# Patient Record
Sex: MALE | Race: WHITE | NOT HISPANIC OR LATINO
[De-identification: names, ages, dates, MRNs, and addresses within clinical notes are randomized per-mention and may not be internally consistent; named-entity substitution may affect disease eponyms.]

---

## 2018-09-19 ENCOUNTER — APPOINTMENT (OUTPATIENT)
Dept: UROLOGY | Facility: CLINIC | Age: 23
End: 2018-09-19
Payer: COMMERCIAL

## 2018-09-19 ENCOUNTER — OUTPATIENT (OUTPATIENT)
Dept: OUTPATIENT SERVICES | Facility: HOSPITAL | Age: 23
LOS: 1 days | Discharge: HOME | End: 2018-09-19

## 2018-09-19 VITALS
SYSTOLIC BLOOD PRESSURE: 164 MMHG | WEIGHT: 209 LBS | HEART RATE: 73 BPM | HEIGHT: 73 IN | DIASTOLIC BLOOD PRESSURE: 74 MMHG | BODY MASS INDEX: 27.7 KG/M2

## 2018-09-19 DIAGNOSIS — Z72.51 HIGH RISK HETEROSEXUAL BEHAVIOR: ICD-10-CM

## 2018-09-19 DIAGNOSIS — Z78.9 OTHER SPECIFIED HEALTH STATUS: ICD-10-CM

## 2018-09-19 PROBLEM — Z00.00 ENCOUNTER FOR PREVENTIVE HEALTH EXAMINATION: Status: ACTIVE | Noted: 2018-09-19

## 2018-09-19 PROCEDURE — 99203 OFFICE O/P NEW LOW 30 MIN: CPT

## 2018-09-20 DIAGNOSIS — Z72.51 HIGH RISK HETEROSEXUAL BEHAVIOR: ICD-10-CM

## 2018-09-21 LAB
APPEARANCE: CLEAR
BILIRUBIN URINE: NEGATIVE
BLOOD URINE: NEGATIVE
COLOR: NORMAL
GLUCOSE QUALITATIVE U: NEGATIVE
KETONES URINE: ABNORMAL
LEUKOCYTE ESTERASE URINE: NEGATIVE
NITRITE URINE: NEGATIVE
PH URINE: 7
PROTEIN URINE: NEGATIVE
SPECIFIC GRAVITY URINE: >=1.03
UROBILINOGEN URINE: 0.2 (ref 0.2–?)

## 2018-10-02 LAB
MYCOPLASMA HOMINIS CULTURE: NORMAL
UREAPLASMA CULTURE: NORMAL
UREAPLASMA, PRELIMINARY CULTURE: NORMAL

## 2018-11-06 ENCOUNTER — OUTPATIENT (OUTPATIENT)
Dept: OUTPATIENT SERVICES | Facility: HOSPITAL | Age: 23
LOS: 1 days | Discharge: HOME | End: 2018-11-06

## 2018-11-06 DIAGNOSIS — Z00.00 ENCOUNTER FOR GENERAL ADULT MEDICAL EXAMINATION WITHOUT ABNORMAL FINDINGS: ICD-10-CM

## 2018-11-07 LAB
HCV AB SER QL: NONREACTIVE
HCV S/CO RATIO: 0.11 S/CO
HSV 1+2 IGG SER IA-IMP: NEGATIVE
HSV 1+2 IGG SER IA-IMP: NEGATIVE
HSV1 IGG SER QL: 0.24 INDEX
HSV2 IGG SER QL: 0.12 INDEX
T PALLIDUM AB SER QL IA: NEGATIVE

## 2018-11-09 LAB
HSV1 IGM SER QL: NORMAL TITER
HSV2 AB FLD-ACNC: NORMAL TITER

## 2020-12-09 ENCOUNTER — APPOINTMENT (OUTPATIENT)
Dept: UROLOGY | Facility: CLINIC | Age: 25
End: 2020-12-09
Payer: COMMERCIAL

## 2020-12-09 VITALS — HEIGHT: 73 IN | TEMPERATURE: 98.3 F | BODY MASS INDEX: 27.7 KG/M2 | WEIGHT: 209 LBS

## 2020-12-09 PROCEDURE — 99072 ADDL SUPL MATRL&STAF TM PHE: CPT

## 2020-12-09 PROCEDURE — 99213 OFFICE O/P EST LOW 20 MIN: CPT

## 2020-12-09 NOTE — REVIEW OF SYSTEMS
[Feeling Poorly] : not feeling poorly [Feeling Tired] : not feeling tired [Chest Pain] : no chest pain [Cough] : no cough [Constipation] : no constipation [Dysuria] : no dysuria

## 2020-12-09 NOTE — ASSESSMENT
[FreeTextEntry1] : Minimal vein right dorsal penis question resolving thrombosis. Patient reassured. Can take aspirin daily

## 2020-12-09 NOTE — PHYSICAL EXAM
[General Appearance - In No Acute Distress] : no acute distress [Penis Abnormality] : normal uncircumcised penis [FreeTextEntry1] : Small vein proximal right dorsal surface minimal firmness [Edema] : no peripheral edema [] : no respiratory distress [Oriented To Time, Place, And Person] : oriented to person, place, and time [Normal Station and Gait] : the gait and station were normal for the patient's age

## 2020-12-28 ENCOUNTER — APPOINTMENT (OUTPATIENT)
Dept: UROLOGY | Facility: CLINIC | Age: 25
End: 2020-12-28

## 2023-03-14 ENCOUNTER — APPOINTMENT (OUTPATIENT)
Dept: PSYCHIATRY | Facility: CLINIC | Age: 28
End: 2023-03-14

## 2023-03-14 ENCOUNTER — OUTPATIENT (OUTPATIENT)
Dept: OUTPATIENT SERVICES | Facility: HOSPITAL | Age: 28
LOS: 1 days | End: 2023-03-14
Payer: COMMERCIAL

## 2023-03-14 DIAGNOSIS — F41.9 ANXIETY DISORDER, UNSPECIFIED: ICD-10-CM

## 2023-03-14 DIAGNOSIS — F33.3 MAJOR DEPRESSIVE DISORDER, RECURRENT, SEVERE WITH PSYCHOTIC SYMPTOMS: ICD-10-CM

## 2023-03-14 PROCEDURE — 90791 PSYCH DIAGNOSTIC EVALUATION: CPT

## 2023-03-14 NOTE — RISK ASSESSMENT
[Clinical Interview] : Clinical Interview [Mood disorder] : mood disorder [Depressed mood/Anhedonia] : depressed mood/anhedonia [Severe anxiety, agitation or panic] : severe anxiety, agitation or panic [Family Hx of suicide/suicidal behavior] : family history of suicide/suicidal behavior [Identifies reasons for living] : identifies reasons for living [Supportive social network of family or friends] : supportive social network of family or friends [Responsibility to children, family, or others] : responsibility to children, family, or others [Fear of death/actual act of killing self] : fear of death or the actual act of killing self [Engaged in work or school] : engaged in work or school [Beloved pets] : beloved pets [None in the patient's lifetime] : None in the patient's lifetime [No known risk factors] : No known risk factors [Feeling of being under threat and being unable to control threat] : feeling of being under threat and being unable to control threat [Irritability] : irritability [Residential stability] : residential stability [Relationship stability] : relationship stability [Employment stability] : employment stability [Engagement in treatment] : engagement in treatment [No] : no

## 2023-03-15 DIAGNOSIS — F41.9 ANXIETY DISORDER, UNSPECIFIED: ICD-10-CM

## 2023-03-15 DIAGNOSIS — F33.3 MAJOR DEPRESSIVE DISORDER, RECURRENT, SEVERE WITH PSYCHOTIC SYMPTOMS: ICD-10-CM

## 2023-03-20 NOTE — DISCUSSION/SUMMARY
[Low acute suicide risk] : Low acute suicide risk [No] : No [Not clinically indicated] : Safety Plan completed/updated (for individuals at risk): Not clinically indicated [FreeTextEntry1] : Assessment Summary: Referral received from Dr Stevens. Intake complete on 3/14/23 from 10:AM- 10:30 AM. Patient signed all consents including HIPPA release form for his mother Delmy and his PCP Dr Sidhu and Dr Arroyo office. Patient denied  referral.  \par \par Patient is 28 y/o M, single, no kids. Domiciled with his parents' mom is 59 y/o F, dad is 63 y/o. Has one older brother 35 y/o M. Currently employed at [employer] as a  but is on disability for 2 more weeks. Denied Substance use. Trauma- denies, denies IPP, denies S/H/I self-harming behaviors, currently denies S/H/I. Denies incarceration. Denies smoking cigarettes or marijuana. Socially would have a drink “depends on the mood, beer, wine”. Denies damage to property.  \par Diagnosis of: OCD- “Hypochondria fear of getting sick, obsessive thoughts, anxiety, frequent hand washing, knocks on wood I’m superstitious”. Has anxiety, and a low Vitamin D. Reports he just started Magnesium and Vitamin D. He takes an antidepressant: fluoxetine 100 mg. Being prescribed by his NP at Dr [facility]s private clinic.  Been seeing the NP in Dr Lou's office since November 2022. PCP is Dr Sidhu. Reported that his cousin committed suicide 2 years ago he was 28 y/o. Denies S/H/I or self-harming behavior.  \par \par Approved by Aaron to come to our clinic for therapy only and Aaron requested A[facility], patient states he may eventually move his services here for psychiatry as well.      \par  \par Assessed Needs- Functional Domain: Anxiety/OCD\par  \par Prioritized Assessed Needs: Anxiety/OCD  \par  \par Life goals, strengths, barriers, past successes: "get , have children, to have my own house, and be mentally stable, strengths: very observant, athletic, I'm a good listener,   barriers: OCD symptoms, past successes: college degree, i saved up money. \par  \par Recommendation:\par  \par [ ]      Medication Only\par [x ]     Individual therapy only\par [ ]     Medication and Individual therapy\par [ ]     Group therapy

## 2023-03-20 NOTE — PSYCHOSOCIAL ASSESSMENT
[None known] : None known [Other: _____] : [unfilled] [Competitive and integrated employment] : Competitive and integrated employment [35 hours or more] : 35 hours or more [Earned income] : earned income [Financially stable] : financially stable [None] : none [Private residence (home, apartment, rooming house, hotel, motel, supported housing, supported Single Room Occupancy (SRO),] : Private residence (home, apartment, rooming house, hotel, motel, supported housing, supported Single Room Occupancy (SRO), permanent housing programs, transient housing programs, and shelter plus care housing) [Client's parents (biological, adoptive, stepparent)] : client's parents (biological, adoptive, stepparent) [Mother] : mother [No] : Patient has personal representation (legal guardian, representative payee, conservatorship)? No [FreeTextEntry7] : Delmy Villatoro [phone]

## 2023-03-20 NOTE — SOCIAL HISTORY
[FreeTextEntry1] : Employment history:Currently employed at [employer] as a  but is on disability for 2 more weeks. \par Developmental history: As a baby in  had delayed speech. \par Social supports (friends, Volunteers, club, AA meeting, other meetings ) ?: no\par Meaningful Activities: "i work out occasionally"

## 2023-03-20 NOTE — PAST MEDICAL HISTORY
[FreeTextEntry1] : Referral received from Dr Stevens. Intake complete on 3/14/23 from 10:AM- 10:30 AM. Patient signed all consents including HIPPA release form for his mother Delmy and his PCP Dr Sidhu and Dr Arroyo office. Patient denied  referral.  \par \par Patient is 28 y/o M, single, no kids. Domiciled with his parents' mom is 59 y/o F, dad is 61 y/o. Has one older brother 35 y/o M. Currently employed at [employer] as a  but is on disability for 2 more weeks. Denied Substance use. Trauma- denies, denies IPP, denies S/H/I self-harming behaviors, currently denies S/H/I. Denies incarceration. Denies smoking cigarettes or marijuana. Socially would have a drink “depends on the mood, beer, wine”. Denies damage to property.  \par \par Diagnosis of: OCD- “Hypochondria fear of getting sick, obsessive thoughts, anxiety, frequent hand washing, knocks on wood I’m superstitious”. Has anxiety, and a low Vitamin D. Reports he just started Magnesium and Vitamin D. He takes an antidepressant: fluoxetine 100 mg. Being prescribed by his NP at [facility] private clinic.  Been seeing the NP in Dr Lou's office since November 2022. PCP is Dr Sidhu. Reported that his cousin committed suicide 2 years ago he was 28 y/o. Denies S/H/I or self-harming behavior.  \par \par Approved by Aaron to come to our clinic for therapy only and Aaron requested Ayla, patient states he may eventually move his services here for psychiatry as well.

## 2023-03-20 NOTE — HISTORY OF PRESENT ILLNESS
[Not Applicable] : Not applicable [FreeTextEntry1] : Referral received from Dr Stevens. Intake complete on 3/14/23 from 10:AM- 10:30 AM. Patient signed all consents including HIPPA release form for his mother Delmy and his PCP Dr Sidhu and Dr Lou's office. Patient denied  referral.  \par \par Patient is 28 y/o M, single, no kids. Domiciled with his parents' mom is 57 y/o F, dad is 61 y/o. Has one older brother 33 y/o M. Currently employed at [employer] as a  but is on disability for 2 more weeks. Denied Substance use. Trauma- denies, denies IPP, denies S/H/I self-harming behaviors, currently denies S/H/I. Denies incarceration. Denies smoking cigarettes or marijuana. Socially would have a drink “depends on the mood, beer, wine”. Denies damage to property.  \par \par Diagnosis of: OCD- “Hypochondria fear of getting sick, obsessive thoughts, anxiety, frequent hand washing, knocks on wood I’m superstitious”. Has anxiety, and a low Vitamin D. Reports he just started Magnesium and Vitamin D. He takes an antidepressant: fluoxetine 100 mg. Being prescribed by his NP at [facility].  Been seeing the NP in Dr Arroyo office since November 2022. PCP is Dr Sidhu. Reported that his cousin committed suicide 2 years ago he was 28 y/o. Denies S/H/I or self-harming behavior.  \par \par Approved by Aaron to come to our clinic for therapy only and Aaron requested Ayla, patient states he may eventually move his services here for psychiatry as well.

## 2023-03-20 NOTE — FAMILY HISTORY
[FreeTextEntry1] : Family composition: Patient is 26 y/o M, single, no kids. Domiciled with his parents' mom is 57 y/o F, dad is 61 y/o. Has one older brother 33 y/o M. \par Family history and background:Born in [place] and raised in [place]. His parents are of [place] descent. \par Family relationship: Good.\par Pertinent Family Medical, MH and Substance Use History including Adult Child of Alcoholic and child of substance abuse status; history of cancer and heart disease:\par Family is in good shape. \par \par Cultural Assessment:\par Race/ethnicity: White\par Sexual identity: Straight\par Spirituality/Restorationism: Church\par \par

## 2023-03-20 NOTE — HEALTH RISK ASSESSMENT
[Patient/Caregiver not ready to engage] : , patient/caregiver not ready to engage [Patient/Collateral not ready to engage] : , patient/collateral not ready to engage [No, patient unwilling or unclear about wishes] : No, patient unwilling or unclear about wishes [AdvancecareDate] : 3/14/23 [] : 3/14/23

## 2023-03-20 NOTE — REASON FOR VISIT
[Number can be texted] : number can be texted [OK  to leave message] : OK  to leave message [Other:___] : [unfilled] [[facility] Provider/Facility] : [facility] Provider/Facility [Patient] : Patient [FreeTextEntry3] : [email or web address] [FreeTextEntry5] : English [FreeTextEntry6] : Kvng [FreeTextEntry2] : OCD, Anxiety. [FreeTextEntry1] : OCD thoughts, Hypochondria, fear of getting sick.

## 2023-03-21 ENCOUNTER — TRANSCRIPTION ENCOUNTER (OUTPATIENT)
Age: 28
End: 2023-03-21

## 2023-03-21 ENCOUNTER — APPOINTMENT (OUTPATIENT)
Dept: PSYCHIATRY | Facility: CLINIC | Age: 28
End: 2023-03-21

## 2023-03-21 ENCOUNTER — OUTPATIENT (OUTPATIENT)
Dept: OUTPATIENT SERVICES | Facility: HOSPITAL | Age: 28
LOS: 1 days | End: 2023-03-21
Payer: COMMERCIAL

## 2023-03-21 DIAGNOSIS — F33.1 MAJOR DEPRESSIVE DISORDER, RECURRENT, MODERATE: ICD-10-CM

## 2023-03-21 DIAGNOSIS — F42.2 MIXED OBSESSIONAL THOUGHTS AND ACTS: ICD-10-CM

## 2023-03-21 PROCEDURE — 90834 PSYTX W PT 45 MINUTES: CPT

## 2023-03-21 NOTE — PLAN
[FreeTextEntry2] : "My goal is to not live in fear" [Other: ____] : [unfilled] [de-identified] : Kvng was seen in person for first individual therapy session after intake assessment completed. Contact information provided. \par Presented with sunglasses and asked to keep them on because of "eye floaters that bother me with the light". Patient cooperative, talkative and candid about his symptoms. \par Patient is 26 y/o single male, no kids and domiciled with his parents.. Has one older brother 33 y/o M. Currently employed at [employer] as a , on disability and will be returning to the office next week. Reports worried about his return as he has not been in the office since November, 2022.  Has a coworker (sits next to him) which he describes as a bully, condescending which he feels he has to "walk on eggshells around him". Denied Substance use. Trauma- denies, denies IPP, denies S/H/I self-harming behaviors, currently denies S/H/I. Denies incarceration. Denies smoking cigarettes or marijuana. \par He reports his anxiety stemming from losing his hair in HS (had hair transplant), feeling social anxiety, panic, described self as shy.  Also remembers receiving Speech services before  because he was a late talker. \par He blames his symptoms worsened after having Covid which affected his GI track. As a result, he started thinking he had cancer and other medical issues.  \par He started "seeing eye floaters"  and they are worsen by brightness, feels his anxiety makes him think that eventually he lose his sight.  Patient has isolated for the last 4 months at home and avoids going out if he can.  He has been "bonding with Dad" as he is retired.  Admits that he avoids hanging out with his friends even when some reach out to him.  He misses being in a romantic relationship, last one ended in 2020 after 2 years of relationship. \par \par As per intake, patient carries a Diagnosis of: OCD- “Hypochondria fear of getting sick, obsessive thoughts, anxiety, frequent hand washing, knocks on wood I’m superstitious”. Has anxiety, and a low Vitamin D. Reports he just started Magnesium and Vitamin D. He takes an antidepressant: fluoxetine 100 mg prescribed by his NP at [facility] private clinic. Been seeing the NP in Dr Arroyo office since November 2022. PCP is Dr Sidhu. Reported that his cousin committed suicide 2 years ago he was 26 y/o. Denies S/H/I or self-harming behavior. \par \par Patient sought treatment during college years at [place] [facility] but only went to 3 sessions as "I felt they did not take me seriously. \par Kvng verbalized commitment to therapy going forward.  May need to do virtual during lunch next week. \par  [Return in ____ week(s)] : Return in [unfilled] week(s) [FreeTextEntry1] : Admit in next session.

## 2023-03-21 NOTE — REASON FOR VISIT
[FreeTextEntry4] : 102pm [FreeTextEntry2] : 3/21/2023 [FreeTextEntry5] : 150pm [Patient] : Patient [FreeTextEntry1] : 1st visit with writer after intake assessment

## 2023-03-21 NOTE — PHYSICAL EXAM
[Cooperative] : cooperative [Depressed] : depressed [Anxious] : anxious [Full] : full [Clear] : clear [Linear/Goal Directed] : linear/goal directed [Preoccupations/Ruminations] : preoccupations/ruminations [Obsessional] : obsessional [None Reported] : none reported [Average] : average [WNL] : within normal limits [de-identified] : Had sunglasses on, reported having floaters and bright light bothers him. [FreeTextEntry8] : reports depression, increase anxiety as he has to [place] the city

## 2023-03-21 NOTE — RISK ASSESSMENT
[Yes, patient reports ideation or behavior] : Yes, patient reports ideation or behavior [No, patient denies ideation or behavior] : No, patient denies ideation or behavior [FreeTextEntry8] : Admitted to fleeting SI, denies intention to harm self. No history

## 2023-03-22 DIAGNOSIS — F42.2 MIXED OBSESSIONAL THOUGHTS AND ACTS: ICD-10-CM

## 2023-03-29 ENCOUNTER — APPOINTMENT (OUTPATIENT)
Dept: PSYCHIATRY | Facility: CLINIC | Age: 28
End: 2023-03-29

## 2023-03-29 ENCOUNTER — NON-APPOINTMENT (OUTPATIENT)
Age: 28
End: 2023-03-29

## 2023-03-29 ENCOUNTER — OUTPATIENT (OUTPATIENT)
Dept: OUTPATIENT SERVICES | Facility: HOSPITAL | Age: 28
LOS: 1 days | End: 2023-03-29
Payer: COMMERCIAL

## 2023-03-29 DIAGNOSIS — F33.1 MAJOR DEPRESSIVE DISORDER, RECURRENT, MODERATE: ICD-10-CM

## 2023-03-29 DIAGNOSIS — F42.2 MIXED OBSESSIONAL THOUGHTS AND ACTS: ICD-10-CM

## 2023-03-29 PROCEDURE — 90832 PSYTX W PT 30 MINUTES: CPT | Mod: 95

## 2023-03-29 NOTE — PHYSICAL EXAM
[Cooperative] : cooperative [Anxious] : anxious [Full] : full [Clear] : clear [Linear/Goal Directed] : linear/goal directed [Preoccupations/Ruminations] : preoccupations/ruminations [None Reported] : none reported [Average] : average [WNL] : within normal limits

## 2023-03-29 NOTE — REASON FOR VISIT
[Patient preference] : as per patient preference [Starting, patient seen in-person within last 6 months] : Telehealth services are being started as patient has seen in-person within last 6 months. [Telehealth (audio & video) - Individual/Group] : This visit was provided via telehealth using real-time 2-way audio visual technology. [Other Location: e.g. Home (Enter Location, City,State)___] : The provider was located at [unfilled]. [Home] : The patient, [unfilled], was located at home, [unfilled], at the time of the visit. [Verbal consent obtained from patient/other participant(s)] : Verbal consent for telehealth/telephonic services obtained from patient/other participant(s) [Patient] : Patient [FreeTextEntry4] : 100pm [FreeTextEntry5] : 130pm [FreeTextEntry1] : Admitted to clinic - Psychotherapy follow up

## 2023-03-29 NOTE — PLAN
[Psychoeducation] : Psychoeducation  [Skills training (all types)] : Skills training (all types)  [Supportive Therapy] : Supportive Therapy [FreeTextEntry2] : Reduce anxiety, obsessions and compulsions tendencies [de-identified] : Patient was seen via telehealth video for follow up. \par Reported going back to work on Monday and having a good day.  However yesterday he had to call his mother by midday as he was struggling with anxiety episode.  \par Shared that "the coworkers that were bullying me" were let go which he is relieved of and his former boss is being reinstated as of tomorrow.  He was able to work from home one day a week and chose today as he had therapy session. \par Patient requested tools for his anxiety. Psychoeducation provided for relaxation techniques for box breathing and alternate breathing. \par Clinician demonstrated techniques and encouraged daily use. Also, encouraged to take lunch breaks and complete a form of physical activity. \par Patient was receptive to intervention.  Patient prefers follow up in person and with standing time/day.\par Support provided. \par  [Return in ____ week(s)] : Return in [unfilled] week(s)

## 2023-03-30 DIAGNOSIS — F42.2 MIXED OBSESSIONAL THOUGHTS AND ACTS: ICD-10-CM

## 2023-04-04 ENCOUNTER — APPOINTMENT (OUTPATIENT)
Dept: PSYCHIATRY | Facility: CLINIC | Age: 28
End: 2023-04-04

## 2023-04-04 ENCOUNTER — OUTPATIENT (OUTPATIENT)
Dept: OUTPATIENT SERVICES | Facility: HOSPITAL | Age: 28
LOS: 1 days | End: 2023-04-04
Payer: COMMERCIAL

## 2023-04-04 DIAGNOSIS — F33.1 MAJOR DEPRESSIVE DISORDER, RECURRENT, MODERATE: ICD-10-CM

## 2023-04-04 PROCEDURE — 90832 PSYTX W PT 30 MINUTES: CPT

## 2023-04-04 NOTE — END OF VISIT
[Duration of Psychotherapy Visit (minutes spent in synchronous communication): ____] : Duration of Psychotherapy Visit (minutes spent in synchronous communication): [unfilled]

## 2023-04-05 DIAGNOSIS — F33.1 MAJOR DEPRESSIVE DISORDER, RECURRENT, MODERATE: ICD-10-CM

## 2023-04-05 NOTE — REASON FOR VISIT
[Patient] : Patient [FreeTextEntry4] : 1138am [FreeTextEntry5] : 1205pm [FreeTextEntry2] : 4/4/2023 [FreeTextEntry1] : Psychotherapy follow up

## 2023-04-05 NOTE — PLAN
[Other: ____] : [unfilled] [Return in ____ week(s)] : Return in [unfilled] week(s) [Motivational Interviewing] : Motivational Interviewing  [Supportive Therapy] : Supportive Therapy [FreeTextEntry2] : Reduce anxiety, obsessions and compulsions tendencies [de-identified] : Patient was seen for follow up in person. \par PHQ and NAKUL completed with patient who scored - mild depression and moderate anxiety. \par Session focused on treatment goals and what he wants to accomplish. He would like to not live by the fear and the anxiety he lives by now.  He enjoys traveling and would like to be able to do it more often, wants to date (voices lack of confidence and not handling rejection well). \par He denies feeling depressed however endorses lack of motivation (which he constantly said "Im just lazy")\par Worried about copays being $50 a session and wants to meet every 2 weeks after next session. \par

## 2023-04-05 NOTE — PHYSICAL EXAM
[Cooperative] : cooperative [Anxious] : anxious [Full] : full [Clear] : clear [Linear/Goal Directed] : linear/goal directed [Preoccupations/Ruminations] : preoccupations/ruminations [None Reported] : none reported [Average] : average [WNL] : within normal limits [de-identified] : Patient reported having a better day yesterday at work and not feeling anxious as last week. \par

## 2023-04-13 ENCOUNTER — APPOINTMENT (OUTPATIENT)
Dept: PSYCHIATRY | Facility: CLINIC | Age: 28
End: 2023-04-13

## 2023-04-13 ENCOUNTER — OUTPATIENT (OUTPATIENT)
Dept: OUTPATIENT SERVICES | Facility: HOSPITAL | Age: 28
LOS: 1 days | End: 2023-04-13
Payer: COMMERCIAL

## 2023-04-13 DIAGNOSIS — F42.2 MIXED OBSESSIONAL THOUGHTS AND ACTS: ICD-10-CM

## 2023-04-13 PROCEDURE — 90834 PSYTX W PT 45 MINUTES: CPT

## 2023-04-13 NOTE — DISCUSSION/SUMMARY
[Initial Plan] : Initial Plan [Adherent to treatment recommendations] : adherent to treatment recommendations [Articulate] : articulate [Attempting to realize their potential] : Attempting to realize their potential [Cognitively intact] : cognitively intact [Insightful] : insightful [Intelligent] : intelligent [Motivated to participate in treatment] : motivated to participate in treatment [Motivated and ready for change] : motivated and ready for change [In good health] : in good health [Financially stable] : financially stable [Part of a supportive family] : part of a supportive family [Steady employment] : steady employment [Involved in cultural/spiritual/Latter-day/community activities] : involved in cultural/spiritual/Latter-day/community activities [Housing stability] : housing stability [English fluency] : English fluency [Connected to healthcare] : connected to healthcare [Access to safe outdoor spaces] : access to safe outdoor spaces [FreeTextEntry2] : 4/13/2024 [FreeTextEntry3] : 3/29/2023 [FreeTextEntry5] : "I want to be able to live without fear, increase confidence in myself  and develop meaningful relationships" [Mental Health] : Mental Health [Interpersonal Relationships] : Interpersonal Relationships [Initial] : Initial [every ___ weeks] : every [unfilled] weeks [Improvement in symptoms as evidenced by:] : Improvement in symptoms as evidenced by: [None - Reason others did not participate:] : None - Reason others did not participate:  [Yes] : Yes [Therapist] : Therapist [FreeTextEntry1] : Anxiety / Obsessions. [FreeTextEntry4] : Kvng will improve his confidence in himself leading to decrease in anxiety/obsessions and increase in meaningful interpersonal relationships in the next 4-6 months as evidence by decrease in depression/anxiety scales and development of at least one new relationship.  [de-identified] : Patient will learn CBT concepts and impact on daily functioning. [de-identified] : Manolo Cardoza will utilize weekly journaling, positive self talk and daily relaxation techniques to recognize emotions and triggers. [de-identified] : 4/13/2024 [de-identified] : Obj. OLLIE Calderon will decrease anxiety by practicing exposure, assertiveness and relaxation techniques by meeting 1-2 people in the next 3 months. [de-identified] : 4/13/2024 [de-identified] : Medication management in the community.  [de-identified] : Increase in social support, decrease in anxiety/depression scales mild range.  [Tobacco Screening Completed?] : Tobacco Screening Completed: Yes [Does patient require any additional health services or referrals?] : Does patient require any additional health services or referrals: No

## 2023-04-13 NOTE — DISCUSSION/SUMMARY
[Initial Plan] : Initial Plan [Adherent to treatment recommendations] : adherent to treatment recommendations [Articulate] : articulate [Attempting to realize their potential] : Attempting to realize their potential [Cognitively intact] : cognitively intact [Insightful] : insightful [Intelligent] : intelligent [Motivated to participate in treatment] : motivated to participate in treatment [Motivated and ready for change] : motivated and ready for change [In good health] : in good health [Financially stable] : financially stable [Part of a supportive family] : part of a supportive family [Steady employment] : steady employment [Involved in cultural/spiritual/Baptism/community activities] : involved in cultural/spiritual/Baptism/community activities [Housing stability] : housing stability [English fluency] : English fluency [Connected to healthcare] : connected to healthcare [Access to safe outdoor spaces] : access to safe outdoor spaces [FreeTextEntry2] : 4/13/2024 [FreeTextEntry3] : 3/29/2023 [FreeTextEntry5] : "I want to be able to live without fear, increase confidence in myself  and develop meaningful relationships" [Mental Health] : Mental Health [Interpersonal Relationships] : Interpersonal Relationships [Initial] : Initial [every ___ weeks] : every [unfilled] weeks [Improvement in symptoms as evidenced by:] : Improvement in symptoms as evidenced by: [None - Reason others did not participate:] : None - Reason others did not participate:  [Yes] : Yes [Therapist] : Therapist [FreeTextEntry1] : Anxiety / Obsessions. [FreeTextEntry4] : Kvng will improve his confidence in himself leading to decrease in anxiety/obsessions and increase in meaningful interpersonal relationships in the next 4-6 months as evidence by decrease in depression/anxiety scales and development of at least one new relationship.  [de-identified] : Patient will learn CBT concepts and impact on daily functioning. [de-identified] : Manolo Cardoza will utilize weekly journaling, positive self talk and daily relaxation techniques to recognize emotions and triggers. [de-identified] : 4/13/2024 [de-identified] : Obj. OLLIE Calderon will decrease anxiety by practicing exposure, assertiveness and relaxation techniques by meeting 1-2 people in the next 3 months. [de-identified] : 4/13/2024 [de-identified] : Medication management in the community.  [de-identified] : Increase in social support, decrease in anxiety/depression scales mild range.  [Tobacco Screening Completed?] : Tobacco Screening Completed: Yes [Does patient require any additional health services or referrals?] : Does patient require any additional health services or referrals: No

## 2023-04-14 DIAGNOSIS — F42.2 MIXED OBSESSIONAL THOUGHTS AND ACTS: ICD-10-CM

## 2023-04-17 NOTE — PHYSICAL EXAM
[Cooperative] : cooperative [Anxious] : anxious [Full] : full [Clear] : clear [Linear/Goal Directed] : linear/goal directed [Preoccupations/Ruminations] : preoccupations/ruminations [None Reported] : none reported [Average] : average [WNL] : within normal limits [FreeTextEntry1] : Continues to wear glasses in therapy sessions [de-identified] : Reports feeling "neutral" mood, not sad not happy. Voiced desire to get off meds. \par

## 2023-04-17 NOTE — PHYSICAL EXAM
[Cooperative] : cooperative [Anxious] : anxious [Full] : full [Clear] : clear [Linear/Goal Directed] : linear/goal directed [Preoccupations/Ruminations] : preoccupations/ruminations [None Reported] : none reported [Average] : average [WNL] : within normal limits [FreeTextEntry1] : Continues to wear glasses in therapy sessions [de-identified] : Reports feeling "neutral" mood, not sad not happy. Voiced desire to get off meds. \par

## 2023-04-17 NOTE — PLAN
[Motivational Interviewing] : Motivational Interviewing  [Supportive Therapy] : Supportive Therapy [Other: ____] : [unfilled] [Return in ____ week(s)] : Return in [unfilled] week(s) [FreeTextEntry2] : Goal:   Kvng will improve his confidence in himself leading to decrease in anxiety/obsessions and increase in meaningful interpersonal relationships in the next 4-6 months as evidence by decrease in depression/anxiety scales and development of at least one new relationship. \par \par Objective A: Obj A- Kvng will utilize weekly journaling, positive self talk and daily relaxation techniques to recognize emotions and triggers. \par Objective B: Obj. B- Kvng will decrease anxiety by practicing exposure, assertiveness and relaxation techniques by meeting 1-2 people in the next 3 months.  [de-identified] : Patient was seen for follow up in person. \par Patient collaborated with development of treatment plan. \par Session focused on goals he wants to accomplish while in therapy.  He shared that she went out to dinner with his parents and brother, which he has not done "in a while".   Discussed how he does not have close relationships "but his parents or brothers also do not have close friendships" and he misses having someone to talk to /hang out". \par He also shared that he was not able to approach someone he found attractive while at dinner. He does not feel ready. \par Patient bothered by the fact that prescriber has not reached out to find out how he is doing as he was acting as his therapist and also received a text from former boss/friend which made him feel that their relationships was not genuine as he was mad because patient was out of work sick "but he was the one who got fired"\par Support provided

## 2023-04-17 NOTE — REASON FOR VISIT
[FreeTextEntry4] : 230pm [FreeTextEntry5] : 3:15pm [FreeTextEntry2] : 4/13/2023 [Patient] : Patient [FreeTextEntry1] : Psychotherapy followup

## 2023-04-17 NOTE — PLAN
[Motivational Interviewing] : Motivational Interviewing  [Supportive Therapy] : Supportive Therapy [Other: ____] : [unfilled] [Return in ____ week(s)] : Return in [unfilled] week(s) [FreeTextEntry2] : Goal:   Kvng will improve his confidence in himself leading to decrease in anxiety/obsessions and increase in meaningful interpersonal relationships in the next 4-6 months as evidence by decrease in depression/anxiety scales and development of at least one new relationship. \par \par Objective A: Obj A- Kvng will utilize weekly journaling, positive self talk and daily relaxation techniques to recognize emotions and triggers. \par Objective B: Obj. B- Kvng will decrease anxiety by practicing exposure, assertiveness and relaxation techniques by meeting 1-2 people in the next 3 months.  [de-identified] : Patient was seen for follow up in person. \par Patient collaborated with development of treatment plan. \par Session focused on goals he wants to accomplish while in therapy.  He shared that she went out to dinner with his parents and brother, which he has not done "in a while".   Discussed how he does not have close relationships "but his parents or brothers also do not have close friendships" and he misses having someone to talk to /hang out". \par He also shared that he was not able to approach someone he found attractive while at dinner. He does not feel ready. \par Patient bothered by the fact that prescriber has not reached out to find out how he is doing as he was acting as his therapist and also received a text from former boss/friend which made him feel that their relationships was not genuine as he was mad because patient was out of work sick "but he was the one who got fired"\par Support provided

## 2023-04-27 ENCOUNTER — OUTPATIENT (OUTPATIENT)
Dept: OUTPATIENT SERVICES | Facility: HOSPITAL | Age: 28
LOS: 1 days | End: 2023-04-27

## 2023-04-27 ENCOUNTER — OUTPATIENT (OUTPATIENT)
Dept: OUTPATIENT SERVICES | Facility: HOSPITAL | Age: 28
LOS: 1 days | End: 2023-04-27
Payer: COMMERCIAL

## 2023-04-27 ENCOUNTER — APPOINTMENT (OUTPATIENT)
Dept: PSYCHIATRY | Facility: CLINIC | Age: 28
End: 2023-04-27

## 2023-04-27 DIAGNOSIS — F42.2 MIXED OBSESSIONAL THOUGHTS AND ACTS: ICD-10-CM

## 2023-04-27 PROCEDURE — 90834 PSYTX W PT 45 MINUTES: CPT

## 2023-04-28 DIAGNOSIS — F42.2 MIXED OBSESSIONAL THOUGHTS AND ACTS: ICD-10-CM

## 2023-05-05 NOTE — REASON FOR VISIT
[Patient] : Patient [FreeTextEntry4] : 235pm [FreeTextEntry5] : 320pm [FreeTextEntry2] : 4/27/2023 [FreeTextEntry1] : Psychotherapy follow up

## 2023-05-05 NOTE — PHYSICAL EXAM
[Well groomed] : well groomed [Cooperative] : cooperative [Anxious] : anxious [Full] : full [Clear] : clear [Linear/Goal Directed] : linear/goal directed [Preoccupations/Ruminations] : preoccupations/ruminations [Obsessional] : obsessional [None Reported] : none reported [Average] : average [WNL] : within normal limits [FreeTextEntry1] : Lights dimmed, patient removed sunglasses in office. [de-identified] : Continues to share desire to stop medications and remain therapy only.\par

## 2023-05-05 NOTE — PLAN
[Cognitive and/or Behavior Therapy] : Cognitive and/or Behavior Therapy  [Jay Therapy] : Jay Therapy  [Exposure +/- Response Prevention] : Exposure +/- Response Prevention  [Supportive Therapy] : Supportive Therapy [FreeTextEntry2] : Goal:   Kvng will improve his confidence in himself leading to decrease in anxiety/obsessions and increase in meaningful interpersonal relationships in the next 4-6 months as evidence by decrease in depression/anxiety scales and development of at least one new relationship. \par \par Objective A: Obj A- Kvng will utilize weekly journaling, positive self talk and daily relaxation techniques to recognize emotions and triggers. \par Objective B: Obj. B- Kvng will decrease anxiety by practicing exposure, assertiveness and relaxation techniques by meeting 1-2 people in the next 3 months.  [de-identified] : Patient was seen for follow up in person. \par Reports still feeling anxiety and apathy and not finding luh, even went on a bike ride and came back after riding half way. \par Session focused on practicing mindfulness, being present in the moment and importance of increased tolerance. Discussed behavioral activation as he feels "I am behind, don’t have a girlfriend, don’t have friends". Discussed with patient joining "meet up" and explored areas for socialization and development of hobbies. Patient was interested in hiking activities and committed to exploring the website and at least join one activity in the next 3 months. \par Patient focused on negative thoughts which makes him depressed.  He was able to complete journal log.  Discussed in session. Finds it difficult to identify his thoughts associated with emotions. \par Support provided. \par  [Return in ____ week(s)] : Return in [unfilled] week(s)

## 2023-05-18 ENCOUNTER — APPOINTMENT (OUTPATIENT)
Dept: PSYCHIATRY | Facility: CLINIC | Age: 28
End: 2023-05-18

## 2023-05-18 ENCOUNTER — OUTPATIENT (OUTPATIENT)
Dept: OUTPATIENT SERVICES | Facility: HOSPITAL | Age: 28
LOS: 1 days | End: 2023-05-18
Payer: COMMERCIAL

## 2023-05-18 DIAGNOSIS — F42.2 MIXED OBSESSIONAL THOUGHTS AND ACTS: ICD-10-CM

## 2023-05-18 PROCEDURE — 90834 PSYTX W PT 45 MINUTES: CPT | Mod: 95

## 2023-05-18 NOTE — END OF VISIT
[Individual Psychotherapy for 38-52 minutes] : Individual Psychotherapy for 38 - 52 minutes [Teletherapy Service Provided] : The services provided in this session were delivered via tele-therapy

## 2023-05-24 NOTE — PHYSICAL EXAM
[Well groomed] : well groomed [Cooperative] : cooperative [Anxious] : anxious [Full] : full [Clear] : clear [Linear/Goal Directed] : linear/goal directed [Preoccupations/Ruminations] : preoccupations/ruminations [Obsessional] : obsessional [None Reported] : none reported [Average] : average [WNL] : within normal limits [FreeTextEntry5] : not as engaged, increased hopelessness about his symptoms [de-identified] : Stopped medications, observed to be restless, moving leg, changing positions. \par

## 2023-05-24 NOTE — REASON FOR VISIT
[Patient preference] : as per patient preference [Starting, patient seen in-person within last 6 months] : Telehealth services are being started as patient has seen in-person within last 6 months. [Telehealth (audio & video) - Individual/Group] : This visit was provided via telehealth using real-time 2-way audio visual technology. [Home] : The patient, [unfilled], was located at home, [unfilled], at the time of the visit. [Verbal consent obtained from patient/other participant(s)] : Verbal consent for telehealth/telephonic services obtained from patient/other participant(s) [Patient] : Patient [Medical Office: ([facility])___] : The provider was located at the medical office in [unfilled]. [FreeTextEntry4] : 319pm [FreeTextEntry5] : 4pm [FreeTextEntry1] : Psychotherapy follow up

## 2023-05-24 NOTE — PLAN
[FreeTextEntry2] : Goal:   Kvng will improve his confidence in himself leading to decrease in anxiety/obsessions and increase in meaningful interpersonal relationships in the next 4-6 months as evidence by decrease in depression/anxiety scales and development of at least one new relationship. \par \par Objective A: Obj A- Kvng will utilize weekly journaling, positive self talk and daily relaxation techniques to recognize emotions and triggers. \par Objective B: Obj. B- Kvng will decrease anxiety by practicing exposure, assertiveness and relaxation techniques by meeting 1-2 people in the next 3 months.  [Supportive Therapy] : Supportive Therapy [de-identified] : Patient was seen for follow up via telehealth video. \par Shared that employer wants him to go in person every day and wants to remove his remote day. \par Discussed alternatives for sessions if he is not able to remain working remotely. \par Patient downloaded Meet up james however did not choose an activity that would interest him. \par Stopped his medications. Clinician offered assessment with current prescriber in the team.  He wants to try to wait to be prescribed medications.  Admits to low motivation and energy and clinician noted increased hopelessness about his future "I havent' done what I said I was going to do for homework. \par Patient still expresses apathy, decreased interest in social events despite his desire to be involved in a romantic relationship. \par Clinician validated patients feelings and encouraged small steps with continued practice for breathing, physical activity and thought observation.\par  [Return in ____ week(s)] : Return in [unfilled] week(s) [FreeTextEntry1] : Complete OCD scale when he attends in person.

## 2023-06-01 ENCOUNTER — APPOINTMENT (OUTPATIENT)
Dept: PSYCHIATRY | Facility: CLINIC | Age: 28
End: 2023-06-01

## 2023-06-01 ENCOUNTER — OUTPATIENT (OUTPATIENT)
Dept: OUTPATIENT SERVICES | Facility: HOSPITAL | Age: 28
LOS: 1 days | End: 2023-06-01
Payer: COMMERCIAL

## 2023-06-01 DIAGNOSIS — F41.1 GENERALIZED ANXIETY DISORDER: ICD-10-CM

## 2023-06-01 DIAGNOSIS — F42.2 MIXED OBSESSIONAL THOUGHTS AND ACTS: ICD-10-CM

## 2023-06-01 DIAGNOSIS — F33.1 MAJOR DEPRESSIVE DISORDER, RECURRENT, MODERATE: ICD-10-CM

## 2023-06-01 PROCEDURE — 90834 PSYTX W PT 45 MINUTES: CPT | Mod: 95

## 2023-06-02 DIAGNOSIS — F41.1 GENERALIZED ANXIETY DISORDER: ICD-10-CM

## 2023-06-02 DIAGNOSIS — F42.2 MIXED OBSESSIONAL THOUGHTS AND ACTS: ICD-10-CM

## 2023-06-02 NOTE — PLAN
[Supportive Therapy] : Supportive Therapy [Return in ____ week(s)] : Return in [unfilled] week(s) [Cognitive and/or Behavior Therapy] : Cognitive and/or Behavior Therapy  [Motivational Interviewing] : Motivational Interviewing  [FreeTextEntry2] : Goal:   Kvng will improve his confidence in himself leading to decrease in anxiety/obsessions and increase in meaningful interpersonal relationships in the next 4-6 months as evidence by decrease in depression/anxiety scales and development of at least one new relationship. \par \par Objective A: Obj A- Kvng will utilize weekly journaling, positive self talk and daily relaxation techniques to recognize emotions and triggers. \par Objective B: Obj. B- Kvng will decrease anxiety by practicing exposure, assertiveness and relaxation techniques by meeting 1-2 people in the next 3 months.  [de-identified] : Patient was seen for follow up via telehealth video. Called to switch from in person because of lack of transportation. \par Shared that employer wants him to go in person every day and is removing his remote day.  Patient does not want to jeopardize his performance evaluation and possible bonus at the end of the year.  Reports not feeling  Patient wants to continue therapy however he would like to switch to monthly due to high copays. Discussed benefits of frequent sessions and patient voiced understanding.  He is currently off medications and voiced "it was not doing much for me".   \par Patient downloaded a dating james and created profile, however "I havent found anybody I find attractive". \par He booked a trip in September to go to [place] by himself.  He expressed "feeling alive when I travel". Added that he loses interest when he tries something new as it loses its attractiveness to him. \par Encouraged to learn and practice coping skills while at work to avoid overwhelming / draining feeling that he explains "takes me almost the whole weekend to recover". \par Support provided. \par \par \par  \par Clinician validated patients feelings and encouraged small steps with continued practice for breathing, physical activity and thought observation.\par  [FreeTextEntry1] : Mailed  OCD scale

## 2023-06-02 NOTE — PHYSICAL EXAM
[Well groomed] : well groomed [Cooperative] : cooperative [Anxious] : anxious [Full] : full [Clear] : clear [Linear/Goal Directed] : linear/goal directed [Preoccupations/Ruminations] : preoccupations/ruminations [Obsessional] : obsessional [None Reported] : none reported [Average] : average [WNL] : within normal limits [FreeTextEntry5] : feels as if he is" going to have to live with his symptoms" [FreeTextEntry8] : Mild [de-identified] :  \par

## 2023-06-02 NOTE — REASON FOR VISIT
15-Oct-2021 [Patient preference] : as per patient preference [Telehealth (audio & video) - Individual/Group] : This visit was provided via telehealth using real-time 2-way audio visual technology. [Medical Office: ([facility])___] : The provider was located at the medical office in [unfilled]. [Home] : The patient, [unfilled], was located at home, [unfilled], at the time of the visit. [Verbal consent obtained from patient/other participant(s)] : Verbal consent for telehealth/telephonic services obtained from patient/other participant(s) [Patient] : Patient [FreeTextEntry4] : 400PM [FreeTextEntry5] : 89YZ [FreeTextEntry1] : Psychotherapy followup

## 2023-07-06 ENCOUNTER — APPOINTMENT (OUTPATIENT)
Dept: PSYCHIATRY | Facility: CLINIC | Age: 28
End: 2023-07-06

## 2023-07-06 ENCOUNTER — OUTPATIENT (OUTPATIENT)
Dept: OUTPATIENT SERVICES | Facility: HOSPITAL | Age: 28
LOS: 1 days | End: 2023-07-06
Payer: COMMERCIAL

## 2023-07-06 DIAGNOSIS — F42.2 MIXED OBSESSIONAL THOUGHTS AND ACTS: ICD-10-CM

## 2023-07-06 DIAGNOSIS — F33.1 MAJOR DEPRESSIVE DISORDER, RECURRENT, MODERATE: ICD-10-CM

## 2023-07-06 PROCEDURE — 90834 PSYTX W PT 45 MINUTES: CPT

## 2023-07-07 DIAGNOSIS — F42.2 MIXED OBSESSIONAL THOUGHTS AND ACTS: ICD-10-CM

## 2023-07-08 NOTE — PLAN
[Cognitive and/or Behavior Therapy] : Cognitive and/or Behavior Therapy  [Motivational Interviewing] : Motivational Interviewing  [Supportive Therapy] : Supportive Therapy [Return in ____ week(s)] : Return in [unfilled] week(s) [FreeTextEntry2] : Goal:   Kvng will improve his confidence in himself leading to decrease in anxiety/obsessions and increase in meaningful interpersonal relationships in the next 4-6 months as evidence by decrease in depression/anxiety scales and development of at least one new relationship. \par \par Objective A: Obj A- Kvng will utilize weekly journaling, positive self talk and daily relaxation techniques to recognize emotions and triggers. \par Objective B: Obj. B- Kvng will decrease anxiety by practicing exposure, assertiveness and relaxation techniques by meeting 1-2 people in the next 3 months.  [de-identified] : Patient was seen for follow up in person.  Reports that his job only allows him to leave early 1 day a week and works from office rest of days. 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  He is concerned about the amount of work and his ability to manage the stress. \par Shared that he wants to quit and find a new job.  Clinician assisted him with a pro/cons list  He is also contemplating applying for a remote job however is hesitant that this will be abetter option for him due to his tendency to isolate.  Currently he is comfortable at his job, is used to routine and may be able to climb corporate ladder.  The cons is the poor air quality, absence of natural light and amount of stress.  \par He was able to go out with his coworkers and a new employee that he found attractive however he did not ask for her phone number and was not able to follow up with her. \par Support provided. Clinician validated patients feelings and encouraged small steps with continued practice for breathing, physical activity and thought observation.\par He is choosing not to completed OCD scale.  Wellness wheel form given for direction and guidance.

## 2023-07-08 NOTE — PHYSICAL EXAM
[Well groomed] : well groomed [Cooperative] : cooperative [Anxious] : anxious [Full] : full [Clear] : clear [Linear/Goal Directed] : linear/goal directed [Preoccupations/Ruminations] : preoccupations/ruminations [Obsessional] : obsessional [None Reported] : none reported [Average] : average [WNL] : within normal limits [FreeTextEntry5] : " [FreeTextEntry8] : increased anxiety [de-identified] :  \par

## 2023-07-08 NOTE — REASON FOR VISIT
[Patient] : Patient [FreeTextEntry4] : 4:10pm [FreeTextEntry5] : 450pm [FreeTextEntry2] : 7/6/2023 [FreeTextEntry1] : Psychotherapy follow up

## 2023-08-03 ENCOUNTER — APPOINTMENT (OUTPATIENT)
Dept: PSYCHIATRY | Facility: CLINIC | Age: 28
End: 2023-08-03

## 2023-08-17 ENCOUNTER — OUTPATIENT (OUTPATIENT)
Dept: OUTPATIENT SERVICES | Facility: HOSPITAL | Age: 28
LOS: 1 days | End: 2023-08-17
Payer: COMMERCIAL

## 2023-08-17 ENCOUNTER — APPOINTMENT (OUTPATIENT)
Dept: PSYCHIATRY | Facility: CLINIC | Age: 28
End: 2023-08-17

## 2023-08-17 DIAGNOSIS — F33.1 MAJOR DEPRESSIVE DISORDER, RECURRENT, MODERATE: ICD-10-CM

## 2023-08-17 DIAGNOSIS — F42.2 MIXED OBSESSIONAL THOUGHTS AND ACTS: ICD-10-CM

## 2023-08-17 DIAGNOSIS — F41.1 GENERALIZED ANXIETY DISORDER: ICD-10-CM

## 2023-08-17 PROCEDURE — 90834 PSYTX W PT 45 MINUTES: CPT | Mod: 95

## 2023-08-17 NOTE — REASON FOR VISIT
[Patient preference] : as per patient preference [Telehealth (audio & video) - Individual/Group] : This visit was provided via telehealth using real-time 2-way audio visual technology. [Medical Office: ([facility])___] : The provider was located at the medical office in [unfilled]. [Home] : The patient, [unfilled], was located at home, [unfilled], at the time of the visit. [Verbal consent obtained from patient/other participant(s)] : Verbal consent for telehealth/telephonic services obtained from patient/other participant(s) [FreeTextEntry4] : 400pm [FreeTextEntry5] : 430pm [Patient] : Patient [FreeTextEntry1] : Psychotherapy follow up

## 2023-08-17 NOTE — PLAN
[FreeTextEntry2] : Goal:   Kvng will improve his confidence in himself leading to decrease in anxiety/obsessions and increase in meaningful interpersonal relationships in the next 4-6 months as evidence by decrease in depression/anxiety scales and development of at least one new relationship. \par  \par  Objective A: Obj A- Kvng will utilize weekly journaling, positive self talk and daily relaxation techniques to recognize emotions and triggers. \par  Objective B: Obj. B- Kvng will decrease anxiety by practicing exposure, assertiveness and relaxation techniques by meeting 1-2 people in the next 3 months.  [Cognitive and/or Behavior Therapy] : Cognitive and/or Behavior Therapy  [Exposure +/- Response Prevention] : Exposure +/- Response Prevention  [Motivational Interviewing] : Motivational Interviewing  [Supportive Therapy] : Supportive Therapy [de-identified] : Patient was seen for follow up via telehealth video.   Patient apologized for missing last appointment.  Reports feeling better and at times having ambivalence about therapy.  While he does not "want to waste no ones time, he acknowledges feeling reassure he has someone to  talk to.   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 Regarding symptoms he feels like "his brain has a mind of his own" and as draining as it can be giving in to negative thoughts till exhaustion he gets some relief shortly after.  Discussed relaxation, distraction, journaling, exercise as other coping skills he can try more consistently to achieve his goals.  He admits that he has not been consistent with breathing.  He will be traveling to [facility] in September and asked for another session prior to his departure.   Discussed trying new approaches as he travels ie talking to more people, practicing mindfulness and gratefulness. Support provided. Clinician validated patients feelings and encouraged small steps.  [Return in ____ week(s)] : Return in [unfilled] week(s)

## 2023-08-17 NOTE — PHYSICAL EXAM
[Well groomed] : well groomed [Cooperative] : cooperative [Anxious] : anxious [Full] : full [Clear] : clear [Linear/Goal Directed] : linear/goal directed [Preoccupations/Ruminations] : preoccupations/ruminations [Obsessional] : obsessional [None Reported] : none reported [Average] : average [WNL] : within normal limits [FreeTextEntry5] : " [FreeTextEntry8] : decreased anxiety, more settled at work. [de-identified] :  \par

## 2023-08-18 DIAGNOSIS — F42.2 MIXED OBSESSIONAL THOUGHTS AND ACTS: ICD-10-CM

## 2023-08-18 DIAGNOSIS — F41.1 GENERALIZED ANXIETY DISORDER: ICD-10-CM

## 2023-09-14 ENCOUNTER — APPOINTMENT (OUTPATIENT)
Dept: PSYCHIATRY | Facility: CLINIC | Age: 28
End: 2023-09-14

## 2023-09-14 ENCOUNTER — OUTPATIENT (OUTPATIENT)
Dept: OUTPATIENT SERVICES | Facility: HOSPITAL | Age: 28
LOS: 1 days | End: 2023-09-14
Payer: COMMERCIAL

## 2023-09-14 DIAGNOSIS — F42.2 MIXED OBSESSIONAL THOUGHTS AND ACTS: ICD-10-CM

## 2023-09-14 DIAGNOSIS — F33.1 MAJOR DEPRESSIVE DISORDER, RECURRENT, MODERATE: ICD-10-CM

## 2023-09-14 PROCEDURE — 90834 PSYTX W PT 45 MINUTES: CPT | Mod: 95

## 2023-09-15 DIAGNOSIS — F42.2 MIXED OBSESSIONAL THOUGHTS AND ACTS: ICD-10-CM

## 2023-10-05 ENCOUNTER — NON-APPOINTMENT (OUTPATIENT)
Age: 28
End: 2023-10-05

## 2023-10-05 ENCOUNTER — APPOINTMENT (OUTPATIENT)
Dept: PSYCHIATRY | Facility: CLINIC | Age: 28
End: 2023-10-05

## 2023-10-05 ENCOUNTER — OUTPATIENT (OUTPATIENT)
Dept: OUTPATIENT SERVICES | Facility: HOSPITAL | Age: 28
LOS: 1 days | End: 2023-10-05
Payer: COMMERCIAL

## 2023-10-05 DIAGNOSIS — F42.2 MIXED OBSESSIONAL THOUGHTS AND ACTS: ICD-10-CM

## 2023-10-05 DIAGNOSIS — F33.1 MAJOR DEPRESSIVE DISORDER, RECURRENT, MODERATE: ICD-10-CM

## 2023-10-05 DIAGNOSIS — F41.1 GENERALIZED ANXIETY DISORDER: ICD-10-CM

## 2023-10-05 PROCEDURE — 90834 PSYTX W PT 45 MINUTES: CPT | Mod: 95

## 2023-10-06 DIAGNOSIS — F33.1 MAJOR DEPRESSIVE DISORDER, RECURRENT, MODERATE: ICD-10-CM

## 2023-10-16 PROBLEM — F41.1 GAD (GENERALIZED ANXIETY DISORDER): Status: ACTIVE | Noted: 2023-04-13

## 2023-10-16 PROBLEM — F42.2 MIXED OBSESSIONAL THOUGHTS AND ACTS: Status: ACTIVE | Noted: 2023-03-21

## 2024-07-20 ENCOUNTER — NON-APPOINTMENT (OUTPATIENT)
Age: 29
End: 2024-07-20